# Patient Record
Sex: FEMALE | Race: WHITE | NOT HISPANIC OR LATINO | Employment: STUDENT | URBAN - METROPOLITAN AREA
[De-identification: names, ages, dates, MRNs, and addresses within clinical notes are randomized per-mention and may not be internally consistent; named-entity substitution may affect disease eponyms.]

---
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
## 2022-07-21 ENCOUNTER — HOSPITAL ENCOUNTER (EMERGENCY)
Facility: HOSPITAL | Age: 22
Discharge: HOME | End: 2022-07-21
Attending: EMERGENCY MEDICINE | Admitting: EMERGENCY MEDICINE

## 2022-07-21 ENCOUNTER — APPOINTMENT (OUTPATIENT)
Dept: GENERAL RADIOLOGY | Facility: HOSPITAL | Age: 22
End: 2022-07-21

## 2022-07-21 VITALS
SYSTOLIC BLOOD PRESSURE: 120 MMHG | OXYGEN SATURATION: 97 % | HEART RATE: 104 BPM | DIASTOLIC BLOOD PRESSURE: 71 MMHG | RESPIRATION RATE: 18 BRPM | TEMPERATURE: 98.9 F

## 2022-07-21 DIAGNOSIS — S82.61XA CLOSED AVULSION FRACTURE OF LATERAL MALLEOLUS OF RIGHT FIBULA, INITIAL ENCOUNTER: Primary | ICD-10-CM

## 2022-07-21 PROCEDURE — 99282 EMERGENCY DEPT VISIT SF MDM: CPT

## 2022-07-21 PROCEDURE — 73610 X-RAY EXAM OF ANKLE: CPT

## 2022-07-21 RX ORDER — NAPROXEN 500 MG/1
500 TABLET ORAL 2 TIMES DAILY PRN
Qty: 15 TABLET | Refills: 0 | Status: SHIPPED | OUTPATIENT
Start: 2022-07-21

## 2022-07-21 NOTE — ED NOTES
PT presents to ED with complaints of R ankle pain. Pt reports she rolled her ankle, and reports hx of multiple fractures in that ankle. Pt is A&OX4, able to ambulate, and in a mask at this time.     Patient was placed in face mask during first look triage.  Patient was wearing a face mask throughout encounter.  I wore personal protective equipment throughout the encounter.  Hand hygiene was performed before and after patient encounter.

## 2022-07-21 NOTE — ED PROVIDER NOTES
MD ATTESTATION NOTE  I wore full protective equipment throughout this patient encounter including an N95 face mask, googles, gown and gloves. Hand hygiene was performed before donning protective equipment and after removal when leaving the room.    The BRENDEN and I have discussed this patient's history, physical exam, and treatment plan. I have reviewed the documentation and personally had a face to face interaction with the patient. I affirm the BRENDEN documentation and agree with their diagnostics, findings, treatment, plan, and disposition.    I provided a substantive portion of the care of this patient.  I personally performed the physical exam, in its entirety.  The attached note describes my personal findings.    Marsha Maurer is a 22 y.o. female who presents to the ED c/o right ankle pain.  Patient reports she rolled her ankle yesterday.  Patient denies any other injury occurred.  Patient complains of dull pain at her lateral ankle, worsened with ambulation or weightbearing.  Patient denies any skin tear, denies any other leg pain, denies any weakness or numbness.  Patient denies any history of fractures or surgeries at that side in the past.    On exam:  General: NAD.  Head: NCAT.  ENT: nares patent, no scleral icterus  Neck: Supple, trachea midline.  Cardiac: regular rate and rhythm.  Lungs: normal effort.  Abdomen: Soft, NTTP.   Extremities: Moves all extremities well  Right lower extremity: Patient has tenderness and swelling at right lateral malleolus, skin is intact, no crepitus or deformity, no tenderness or swelling in the foot or in the calf. Ankle/toes up/down, sensation intact light touch all peripheral nerve distributions, 2+ dorsalis pedis and posterior tibial pulses, brisk cap refill all digits.  Neuro: alert, MAEW, follows commands  Psych: calm, cooperative  Skin: Warm, dry.    Medical Decision Making:  After the initial H&P, I discussed pertinent information from history and physical exam with  patient/family.  Discussed differential diagnosis.  Discussed plan for ED evaluation/work-up/treatment.  All questions answered.  Patient/family is agreeable with plan.    ED Course as of 07/21/22 8049   Thu Jul 21, 2022   1634 I viewed XR ankle right on PACS system.  My interpretation is possible small avulsion fracture to the distal fibula.     [DC]      ED Course User Index  [DC] Dejuan Rolon, PA       Diagnosis  Final diagnoses:   Closed avulsion fracture of lateral malleolus of right fibula, initial encounter        Dani Coffman MD  07/21/22 2619

## 2022-07-21 NOTE — ED PROVIDER NOTES
EMERGENCY DEPARTMENT ENCOUNTER    Room Number:  B03/03  Date of encounter:  2022  PCP: Provider, No Known  Historian: Patient  Full history not obtainable due to: None    HPI:  Chief Complaint: Ankle pain    Context: Marsha Maurer is a 22 y.o. female who presents to the ED c/o aching and nonradiating pain with associated swelling and bruising to the right ankle.  The patient was walking down a flight of stairs yesterday and rolled the ankle.  Since that time she has had pain that is worse with weightbearing and range of motion.  She took some Tylenol with some relief.  Denies any tingling or numbness distally.  No pain in the knee on the right side.  No other injuries sustained at the time of the accident.      MEDICAL RECORD REVIEW:    Upon review of the medical records the patient was most recently seen for  follow-up at her OB office on 10/22/2021.      PAST MEDICAL HISTORY    Active Ambulatory Problems     Diagnosis Date Noted   • No Active Ambulatory Problems     Resolved Ambulatory Problems     Diagnosis Date Noted   • No Resolved Ambulatory Problems     No Additional Past Medical History         PAST SURGICAL HISTORY  Past Surgical History:   Procedure Laterality Date   • INSERTION OF CONTRACEPTIVE CAPSULE  2014         FAMILY HISTORY  Family History   Problem Relation Age of Onset   • No Known Problems Father    • No Known Problems Mother    • No Known Problems Brother    • No Known Problems Sister    • No Known Problems Paternal Grandmother    • No Known Problems Maternal Grandmother    • No Known Problems Maternal Grandfather    • No Known Problems Sister    • No Known Problems Sister          SOCIAL HISTORY  Social History     Socioeconomic History   • Marital status: Single   Tobacco Use   • Smoking status: Never Smoker   Substance and Sexual Activity   • Alcohol use: No   • Drug use: Yes     Types: Marijuana     Comment: Past history   • Sexual activity: Yes     Partners: Male     Birth  control/protection: Inserts     Comment: Nexplanon  2017         ALLERGIES  Patient has no known allergies.        REVIEW OF SYSTEMS  Review of Systems   Constitutional: Negative for chills and fever.   HENT: Negative for congestion.    Eyes: Negative for visual disturbance.   Respiratory: Negative for shortness of breath.    Cardiovascular: Negative for chest pain.   Gastrointestinal: Negative for abdominal pain, nausea and vomiting.   Genitourinary: Negative for difficulty urinating.   Musculoskeletal: Positive for arthralgias and joint swelling. Negative for gait problem.   Skin: Negative for wound.   Neurological: Negative for syncope.   Psychiatric/Behavioral: Negative for suicidal ideas.      All systems reviewed and marked as negative except as listed in HPI       PHYSICAL EXAM    I have reviewed the triage vital signs and nursing notes.    ED Triage Vitals   Temp Heart Rate Resp BP SpO2   22 1534 22 1534 22 1534 22 1535 22 1534   98.9 °F (37.2 °C) 104 18 120/71 97 %      Temp src Heart Rate Source Patient Position BP Location FiO2 (%)   22 1534 22 1534 22 1535 22 1535 --   Tympanic Monitor Sitting Right arm        Physical Exam  Constitutional:       General: She is not in acute distress.     Appearance: She is not ill-appearing.   HENT:      Head: Normocephalic and atraumatic.   Eyes:      General: No scleral icterus.  Cardiovascular:      Rate and Rhythm: Normal rate and regular rhythm.   Pulmonary:      Effort: Pulmonary effort is normal.      Breath sounds: Normal breath sounds.   Musculoskeletal:      Right ankle: Swelling and ecchymosis present. Tenderness present over the lateral malleolus. Decreased range of motion.   Skin:     General: Skin is warm and dry.   Neurological:      Mental Status: She is alert and oriented to person, place, and time.   Psychiatric:         Mood and Affect: Mood normal.         Speech: Speech normal.          Behavior: Behavior normal.         Vital signs and nursing notes reviewed.            LAB RESULTS  No results found for this or any previous visit (from the past 24 hour(s)).    Ordered the above labs and independently reviewed the results.        RADIOLOGY  XR Ankle 3+ View Right    Result Date: 7/21/2022  THREE VIEWS OF THE RIGHT ANKLE  HISTORY: 22-year-old female status post fall with right ankle pain.  FINDINGS: AP, oblique and lateral radiographs of the right ankle were obtained and demonstrate an osseous structure at the inferior tip of the lateral malleolus. The osseous structure measures on the order of 0.4 cm and shows peripheral cortication. There is no definite overlying soft tissue swelling. No other bony abnormality is appreciated. Mild soft tissue swelling anterior to the right ankle mortise is noted.      There is a peripherally corticated 4 mm osseous structure at the inferior tip of the lateral malleolus. This may represent an accessory ossicle although I cannot completely exclude an avulsion fracture. Mild soft tissue swelling overlying the anterior aspect of the ankle mortise is noted but there is no evidence for overlying lateral malleolar soft tissue swelling. Clinical correlation is recommended. If there is point tenderness overlying the lateral malleolus this may represent an avulsion fracture.        I ordered the above noted radiological studies. Independently reviewed by me and discussed with radiologist.  See dictation above for official radiology interpretation.      PROCEDURES    Procedures        MEDICATIONS GIVEN IN ER    Medications - No data to display      PROGRESS, DATA ANALYSIS, CONSULTS, AND MEDICAL DECISION MAKING    All labs have been independently reviewed by me.  All radiology studies have been reviewed by me.   EKG's independently reviewed by me.  Discussion below represents my analysis of pertinent findings related to patient's condition, differential diagnosis, treatment  plan and final disposition.    DIFFERENTIAL DIAGNOSIS INCLUDE BUT NOT LIMITED TO:     Ankle sprain, foot sprain, ankle fracture, foot fracture    ED Course as of 07/21/22 1736   Thu Jul 21, 2022   1634 I viewed XR ankle right on PACS system.  My interpretation is possible small avulsion fracture to the distal fibula.     [DC]      ED Course User Index  [DC] Dejuan Rolon PA       AS OF 17:36 EDT VITALS:    BP - 120/71  HR - 104  TEMP - 98.9 °F (37.2 °C) (Tympanic)  02 SATS - 97%    1736 I rechecked the patient.  I discussed the patient's radiology findings (including all incidental findings), diagnosis, and plan for discharge.  A repeat exam reveals no new worrisome changes from my initial exam findings.  The patient understands that the fact that they are being discharged does not denote that nothing is abnormal, it indicates that no clinical emergency is present and that they must follow-up as directed in order to properly maintain their health.  Follow-up instructions (specifically listed below) and return to ER precautions were given at this time.  I specifically instructed the patient to follow-up with their PCP.  The patient understands and agrees with the plan, and is ready for discharge.  All questions answered.        DIAGNOSIS  Final diagnoses:   Closed avulsion fracture of lateral malleolus of right fibula, initial encounter         DISPOSITION  D/c    Pt masked in first look. I wore a surgical mask throughout my encounters with the pt. I performed hand hygiene on entry into the pt room and upon exit.     Dictated utilizing Dragon dictation      Dejuan Rolon PA  07/21/22 5202

## 2022-07-21 NOTE — DISCHARGE INSTRUCTIONS
Please follow-up with your PCP as well as Podiatry.    Rest.  Increase fluid intake.  Ice as needed.  Elevate to reduce swelling.  Use splint and crutches for support.    Although you are being discharged in the ED today, I encouraged her to return for worsening symptoms.  Things can, and do, change such a treatment at home with medication may not be adequate.  I recommend returning for any other worsening or alarming symptoms.     Take meds as prescribed.     You have been evaluated in the emergency department for your presenting symptoms and deemed appropriate for discharge home.  Understand that your health care does not end here today.  It is important that your primary care physician be made aware of your visit.  I recommend calling your primary care provider in the next 48 hours to arrange follow-up care.  Your primary care provider needs to review your treatment and recovery to ensure that no further treatment is necessary.  Additional testing or procedures may be necessary as an outpatient at the discretion of your primary care provider.    I also recommend following up with your PCP for recheck of your blood pressure and treatment as needed.